# Patient Record
Sex: MALE | Race: WHITE | NOT HISPANIC OR LATINO | URBAN - METROPOLITAN AREA
[De-identification: names, ages, dates, MRNs, and addresses within clinical notes are randomized per-mention and may not be internally consistent; named-entity substitution may affect disease eponyms.]

---
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
## 2024-09-06 NOTE — ED PEDIATRIC NURSE NOTE - [facility] ED SYMPTOMS NEG
MD Notification    Notified Person: MD    Notified Person Name: dr. Vo     Notification Date/Time: 9/5/2024 920pm    Notification Interaction: lauryn    Purpose of Notification: requesting melatonin and senakot     Orders Received: ordered     Comments:    no decreased eating/drinking/no fever/no dizziness/no tingling/no numbness/no pain/no nausea/no vomiting/no chills/no weakness